# Patient Record
Sex: MALE | Race: WHITE | NOT HISPANIC OR LATINO
[De-identification: names, ages, dates, MRNs, and addresses within clinical notes are randomized per-mention and may not be internally consistent; named-entity substitution may affect disease eponyms.]

---

## 2018-06-08 ENCOUNTER — APPOINTMENT (OUTPATIENT)
Dept: MRI IMAGING | Facility: CLINIC | Age: 58
End: 2018-06-08
Payer: COMMERCIAL

## 2018-06-08 ENCOUNTER — OUTPATIENT (OUTPATIENT)
Dept: OUTPATIENT SERVICES | Facility: HOSPITAL | Age: 58
LOS: 1 days | End: 2018-06-08
Payer: COMMERCIAL

## 2018-06-08 DIAGNOSIS — Z00.8 ENCOUNTER FOR OTHER GENERAL EXAMINATION: ICD-10-CM

## 2018-06-08 PROCEDURE — A9585: CPT

## 2018-06-08 PROCEDURE — 70553 MRI BRAIN STEM W/O & W/DYE: CPT

## 2018-06-08 PROCEDURE — 70553 MRI BRAIN STEM W/O & W/DYE: CPT | Mod: 26

## 2019-11-18 ENCOUNTER — APPOINTMENT (OUTPATIENT)
Dept: ORTHOPEDIC SURGERY | Facility: CLINIC | Age: 59
End: 2019-11-18
Payer: COMMERCIAL

## 2019-11-18 VITALS
DIASTOLIC BLOOD PRESSURE: 82 MMHG | WEIGHT: 175 LBS | SYSTOLIC BLOOD PRESSURE: 139 MMHG | BODY MASS INDEX: 26.52 KG/M2 | TEMPERATURE: 98.5 F | HEART RATE: 64 BPM | HEIGHT: 68 IN

## 2019-11-18 DIAGNOSIS — M75.41 IMPINGEMENT SYNDROME OF RIGHT SHOULDER: ICD-10-CM

## 2019-11-18 DIAGNOSIS — M75.21 BICIPITAL TENDINITIS, RIGHT SHOULDER: ICD-10-CM

## 2019-11-18 DIAGNOSIS — Z78.9 OTHER SPECIFIED HEALTH STATUS: ICD-10-CM

## 2019-11-18 DIAGNOSIS — S43.431A SUPERIOR GLENOID LABRUM LESION OF RIGHT SHOULDER, INITIAL ENCOUNTER: ICD-10-CM

## 2019-11-18 PROCEDURE — 99214 OFFICE O/P EST MOD 30 MIN: CPT | Mod: 25

## 2019-11-18 PROCEDURE — 20610 DRAIN/INJ JOINT/BURSA W/O US: CPT | Mod: RT

## 2019-11-18 PROCEDURE — 73030 X-RAY EXAM OF SHOULDER: CPT | Mod: RT

## 2019-12-03 NOTE — PROCEDURE
[de-identified] : Consent: \par At this time, I have recommended an injection to the right shoulder.  The risks and benefits of the procedure were discussed with the patient in detail.  Upon verbal consent of the patient, we proceeded with the injection as noted below.  \par \par Procedure:  \par After a sterile prep, the patient underwent an injection of 9 cc of 1% Lidocaine without epinephrine and 1 cc of Kenalog into the right shoulder.  The patient tolerated the procedure well.  There were no complications.

## 2019-12-03 NOTE — DISCUSSION/SUMMARY
[de-identified] : At this time, I have recommended an injection for the right shoulder impingement, SLAP tear and biceps tendinitis, as noted above.  I have recommended ice and elevation.  He will be reassessed in three to four weeks.

## 2019-12-03 NOTE — ADDENDUM
[FreeTextEntry1] : This note was written by Saida Byrnes on 11/29/2019 acting as scribe for Flakito Good III, MD

## 2019-12-03 NOTE — HISTORY OF PRESENT ILLNESS
[de-identified] : The patient comes in today with complaints of pain to his right shoulder.  The patient states the onset/injury occurred on 10/1/19.  This injury is not work related or due to an automobile accident.  The patient states the pain is intermittent and radiating.  The patient describes the pain as stabbing.  The patient states rest, heat, ice and medication makes the pain better while lying down makes the pain worse.\par \par Pain level includes a current pain level of 6-8/10.\par \par Ailment Interference:  \par Daily Livin-5/10\par Normal Work:  6-7/10\par Sleep:  8/10\par Enjoyment of Life:  0/10\par Climbing Stairs:  0/10\par Sports:  8/10\par Extra-Curricular Activities:   8/10 [] : No

## 2019-12-03 NOTE — PHYSICAL EXAM
[de-identified] : Ambulating with a normal gait.  Station:  Normal.  [de-identified] : Left Shoulder:  \par Range of Motion in Degrees:   	\par    	                        Claimant:          Normal:  	\par Abduction (Active)  	180  	180 degrees  	\par Abduction (Passive)  	180  	180 degrees  	\par Forward elevation (Active):  	180  	180 degrees  	\par Forward elevation (Passive):  180  	180 degrees  	\par External rotation (Active):  	45  	45 degrees  	\par External rotation (Passive):  	45  	45 degrees  	\par Internal rotation (Active):  	L-1  	L-1  	\par Internal rotation (Passive):  	L-1  	L-1  	\par \par No motor weakness to internal rotation, external rotation or abduction in the scapular plane.  Negative crank test.  Negative O’Henrry’s test.  Negative Speed’s test. Negative Yergason’s test.  Negative cross arm test.  No tenderness to palpation at the AC joint. Negative Hawkin’s sign.  Negative Neer’s sign.  Negative apprehension. Negative sulcus sign.  No gross neurological or vascular deficits distally.  2+ radial and ulnar pulses.  Skin is intact.  No rashes, scars or lesions.  No extra-articular swelling or tenderness. \par \par Right Shoulder:  \par Range of Motion in Degrees:	\par 	                                  Claimant:	Normal:	\par Abduction (Active)	                  180	               180 degrees	\par Abduction (Passive)	  180	               180 degrees	\par Forward elevation (Active):	  180	               180 degrees	\par Forward elevation (Passive):	  180	               180 degrees	\par External rotation (Active):	   45	               45 degrees	\par External rotation (Passive):	   45	               45 degrees	\par Internal rotation (Active):	   L-1	               L-1	\par Internal rotation (Passive):	   L-1	               L-1	\par  \par No motor weakness to internal rotation, external rotation or abduction in the scapular plane.  Positive crank test.  Positive O’Henrry’s test.  Positive Speed’s test. Positive Yergason’s test.  Negative cross arm test.  No tenderness to palpation at the AC joint. Positive Hawkin’s sign.  Positive Neer’s sign. Negative apprehension. Negative sulcus sign.  No gross neurological or vascular deficits distally.  Skin is intact.  No rashes, scars or lesions. 2+ radial and ulnar pulses. No extra-articular swelling or tenderness.\par  [de-identified] : General Appearance:  Well-developed, well-nourished male in no acute distress. \par  [de-identified] : Radiographs, two views of the right shoulder are essentially normal.

## 2019-12-23 ENCOUNTER — APPOINTMENT (OUTPATIENT)
Dept: ORTHOPEDIC SURGERY | Facility: CLINIC | Age: 59
End: 2019-12-23
Payer: COMMERCIAL

## 2019-12-23 VITALS
HEIGHT: 68 IN | SYSTOLIC BLOOD PRESSURE: 126 MMHG | BODY MASS INDEX: 26.52 KG/M2 | TEMPERATURE: 98.3 F | WEIGHT: 175 LBS | HEART RATE: 68 BPM | DIASTOLIC BLOOD PRESSURE: 79 MMHG

## 2019-12-23 DIAGNOSIS — M18.12 UNILATERAL PRIMARY OSTEOARTHRITIS OF FIRST CARPOMETACARPAL JOINT, LEFT HAND: ICD-10-CM

## 2019-12-23 PROCEDURE — 20600 DRAIN/INJ JOINT/BURSA W/O US: CPT | Mod: LT

## 2019-12-23 PROCEDURE — 99213 OFFICE O/P EST LOW 20 MIN: CPT | Mod: 25

## 2020-01-05 NOTE — HISTORY OF PRESENT ILLNESS
[de-identified] : The patient comes in today with increasing complaints of pain to his left thumb.

## 2020-01-05 NOTE — DISCUSSION/SUMMARY
[de-identified] : At this time, due to basal joint arthritis of the left thumb, I recommended ice and elevation.  He will be reassessed in three to four weeks.

## 2020-01-05 NOTE — PROCEDURE
[de-identified] : Consent: \par At this time, I have recommended an injection to the left thumb.  The risks and benefits of the procedure were discussed with the patient in detail.  Upon verbal consent of the patient, we proceeded with the injection as noted below.  \par \par Procedure:  \par After a sterile prep, the patient underwent an injection of 2 cc of 1% Lidocaine without epinephrine and 0.5 cc of Kenalog into the left thumb.  The patient tolerated the procedure well.  There were no complications.  \par

## 2020-01-05 NOTE — PHYSICAL EXAM
[Normal] : Gait: normal [de-identified] : Right Thumb:\par \par                                    					           Claimant:  	   Normal:  \par \par Thumb Interphalangeal Hyperextension/Flexion		                           15H/80             15H/80\par \par Thumb Metacarpophalangeal Hyperextension/Flexion	                           10/55	    10/55\par \par Finger DIP Joints Extension/Flexion				             0/80	     0/80\par \par Finger PIP Joints Extension/Flexion 				             0/100	     0/100\par \par Finger MCP Joints Hyperextension/Flexion 			      (0-45H)/90	     (0-45H)/90\par \par FDS, FDP intact.  No triggering.  No tenderness or swelling over the A1 pulley for each finger.  No instability to varus or valgus stress.  No motor or sensory deficits.   Less than two second capillary refill.  Skin is intact.  No rashes, scars or lesions.\par \par Left Thumb:\par                                                                                                 Claimant:  	   Normal:  \par \par Thumb Interphalangeal Hyperextension/Flexion		15H/80		   15H/80\par \par Thumb Metacarpophalangeal Hyperextension/Flexion	10/55		    10/55\par \par Finger DIP Joints Extension/Flexion			0/80		     0/80\par \par Finger PIP Joints Extension/Flexion 			0/100		     0/100\par \par Finger MCP Joints Hyperextension/Flexion 		(0-45H)/90	     (0-45H)/90\par \par \par Significant tenderness at the basal joint with a positive grind.  FDS, FDP intact.  No triggering.  No tenderness or swelling over the A1 pulley for each finger.  No instability to varus or valgus stress.  No motor or sensory deficits.  Less than two second capillary refill.  Skin is intact.  No rashes, scars or lesions.\par   [de-identified] : Appearance:  Well-developed, well-nourished male in no acute distress.\par \par

## 2020-01-05 NOTE — ADDENDUM
[FreeTextEntry1] : This note was written by Margarita Weller on 12/31/2019 acting as a scribe for FRANTZ VELASCO III, MD

## 2020-05-27 ENCOUNTER — APPOINTMENT (OUTPATIENT)
Dept: ORTHOPEDIC SURGERY | Facility: CLINIC | Age: 60
End: 2020-05-27
Payer: COMMERCIAL

## 2020-05-27 VITALS
TEMPERATURE: 98.7 F | DIASTOLIC BLOOD PRESSURE: 78 MMHG | SYSTOLIC BLOOD PRESSURE: 135 MMHG | BODY MASS INDEX: 26.52 KG/M2 | WEIGHT: 175 LBS | HEIGHT: 68 IN | HEART RATE: 69 BPM

## 2020-05-27 PROCEDURE — 20600 DRAIN/INJ JOINT/BURSA W/O US: CPT | Mod: LT

## 2020-06-01 NOTE — PHYSICAL EXAM
[de-identified] : Ambulating with a normal gait.  Station:  Normal.  [de-identified] : Right Fingers/Hand: \par Range of Motion in Degrees: \par                                    					           Claimant:  	   Normal:  \par \par Thumb Interphalangeal Hyperextension/Flexion		                           15H/80             15H/80\par \par Thumb Metacarpophalangeal Hyperextension/Flexion	                           10/55	    10/55\par \par Finger DIP Joints Extension/Flexion				             0/80	     0/80\par \par Finger PIP Joints Extension/Flexion 				             0/100	     0/100\par \par Finger MCP Joints Hyperextension/Flexion 			      (0-45H)/90	     (0-45H)/90\par \par FDS, FDP intact.  No triggering.  No tenderness or swelling over the A1 pulley for each finger.  No instability to varus or valgus stress.  No motor or sensory deficits.   Less than two second capillary refill.  Skin is intact.  No rashes, scars or lesions.\par  \par Left Fingers/Hand: \par Range of Motion: \par                                                                                                 Claimant:  	   Normal:  \par \par Thumb Interphalangeal Hyperextension/Flexion		15H/80		   15H/80\par \par Thumb Metacarpophalangeal Hyperextension/Flexion	10/55		    10/55\par \par Finger DIP Joints Extension/Flexion			0/80		     0/80\par \par Finger PIP Joints Extension/Flexion 			0/100		     0/100\par \par Finger MCP Joints Hyperextension/Flexion 		(0-45H)/90	     (0-45H)/90\par \par \par Significant tenderness at the basal joint with a positive grind.  FDS, FDP intact.  No triggering.  No tenderness or swelling over the A1 pulley for each finger.  No instability to varus or valgus stress.  No motor or sensory deficits.  Less than two second capillary refill.  Skin is intact.  No rashes, scars or lesions.\par   [de-identified] : General Appearance:  Well-developed, well-nourished male in no acute distress.

## 2020-06-01 NOTE — ADDENDUM
[FreeTextEntry1] : This note was written by Saida Byrnes on 05/29/2020 acting as scribe for Flakito Good III, MD

## 2020-06-01 NOTE — DISCUSSION/SUMMARY
[de-identified] : At this time, I have recommended ice for the left basal joint arthritis.  He will be reassessed in three to four weeks.

## 2020-06-01 NOTE — HISTORY OF PRESENT ILLNESS
[de-identified] : The patient comes in today with increasing complaints of pain to his left basal joint.

## 2020-06-01 NOTE — PROCEDURE
[de-identified] : Consent: \par At this time, I have recommended an injection to the left basal joint.  The risks and benefits of the procedure were discussed with the patient in detail.  Upon verbal consent of the patient, we proceeded with the injection as noted below.  \par \par Procedure:  \par After a sterile prep, the patient underwent an injection of 2 cc of 1% Lidocaine without epinephrine and 0.5 cc of Kenalog into the left basal joint.  The patient tolerated the procedure well.  There were no complications.

## 2020-07-22 ENCOUNTER — EMERGENCY (EMERGENCY)
Facility: HOSPITAL | Age: 60
LOS: 0 days | Discharge: HOME | End: 2020-07-22
Payer: COMMERCIAL

## 2020-07-22 VITALS
TEMPERATURE: 100 F | OXYGEN SATURATION: 99 % | HEART RATE: 68 BPM | SYSTOLIC BLOOD PRESSURE: 108 MMHG | DIASTOLIC BLOOD PRESSURE: 51 MMHG | RESPIRATION RATE: 16 BRPM

## 2020-07-22 DIAGNOSIS — Z20.828 CONTACT WITH AND (SUSPECTED) EXPOSURE TO OTHER VIRAL COMMUNICABLE DISEASES: ICD-10-CM

## 2020-07-22 PROCEDURE — U0003: CPT

## 2020-07-22 PROCEDURE — 99283 EMERGENCY DEPT VISIT LOW MDM: CPT

## 2020-07-22 NOTE — ED STATDOCS - NSFOLLOWUPINSTRUCTIONS_ED_ALL_ED_FT
Pt here for COVID-19 testing. Pt non toxic. Pt was swabbed for COVID-19 in their car in drive through area. [facility] will call pt with results. return precautions given. Home self-quarantine recommended. Pt agrees with plan of  care.

## 2020-07-22 NOTE — ED STATDOCS - PATIENT PORTAL LINK FT
You can access the FollowMyHealth Patient Portal offered by [facility] by registering at the following website: [email or web address]. By joining [employer]’s FollowMyHealth portal, you will also be able to view your health information using other applications (apps) compatible with our system.

## 2020-07-23 LAB — SARS-COV-2 RNA SPEC QL NAA+PROBE: SIGNIFICANT CHANGE UP

## 2020-08-05 ENCOUNTER — APPOINTMENT (OUTPATIENT)
Dept: ORTHOPEDIC SURGERY | Facility: CLINIC | Age: 60
End: 2020-08-05

## 2020-10-05 ENCOUNTER — APPOINTMENT (OUTPATIENT)
Dept: ORTHOPEDIC SURGERY | Facility: CLINIC | Age: 60
End: 2020-10-05
Payer: COMMERCIAL

## 2020-10-05 VITALS
HEIGHT: 68 IN | TEMPERATURE: 98.2 F | WEIGHT: 170 LBS | BODY MASS INDEX: 25.76 KG/M2 | DIASTOLIC BLOOD PRESSURE: 79 MMHG | HEART RATE: 62 BPM | SYSTOLIC BLOOD PRESSURE: 128 MMHG

## 2020-10-05 PROCEDURE — 20600 DRAIN/INJ JOINT/BURSA W/O US: CPT | Mod: FA

## 2020-10-07 NOTE — ADDENDUM
[FreeTextEntry1] : This note was written by Margarita Weller on 10/06/2020 acting as a scribe for FRANTZ VELASCO III, MD

## 2020-10-07 NOTE — PROCEDURE
[de-identified] : Consent: \par At this time, I have recommended an injection to the left thumb.  The risks and benefits of the procedure were discussed with the patient in detail.  Upon verbal consent of the patient, we proceeded with the injection as noted below.  \par \par Procedure:  \par After a sterile prep, the patient underwent an injection of 2 cc of 1% Lidocaine without epinephrine and 0.5 cc of Kenalog into the left thumb.  The patient tolerated the procedure well.  There were no complications.  \par

## 2020-10-07 NOTE — HISTORY OF PRESENT ILLNESS
[de-identified] : The patient comes in today with increasing complaints of pain of the left thumb.

## 2020-10-07 NOTE — PHYSICAL EXAM
[de-identified] : Left Thumb:\par                                                                                                 Claimant:  	   Normal:  \par \par Thumb Interphalangeal Hyperextension/Flexion		15H/80		   15H/80\par \par Thumb Metacarpophalangeal Hyperextension/Flexion	10/55		    10/55\par \par Finger DIP Joints Extension/Flexion			0/80		     0/80\par \par Finger PIP Joints Extension/Flexion 			0/100		     0/100\par \par Finger MCP Joints Hyperextension/Flexion 		(0-45H)/90	     (0-45H)/90\par \par \par Significant tenderness at the basal joint with a positive grind.  FDS, FDP intact.  No triggering.  No tenderness or swelling over the A1 pulley for each finger.  No instability to varus or valgus stress.  No motor or sensory deficits.  Less than two second capillary refill.  Skin is intact.  No rashes, scars or lesions.\par   [de-identified] : Ambulating with a normal gait. Station:  Normal.  [de-identified] : Appearance:  Well-developed, well-nourished male in no acute distress.\par

## 2020-12-09 ENCOUNTER — OUTPATIENT (OUTPATIENT)
Dept: OUTPATIENT SERVICES | Facility: HOSPITAL | Age: 60
LOS: 1 days | End: 2020-12-09
Payer: COMMERCIAL

## 2020-12-09 DIAGNOSIS — Z11.59 ENCOUNTER FOR SCREENING FOR OTHER VIRAL DISEASES: ICD-10-CM

## 2020-12-09 LAB — SARS-COV-2 RNA SPEC QL NAA+PROBE: SIGNIFICANT CHANGE UP

## 2020-12-09 PROCEDURE — U0003: CPT

## 2020-12-10 DIAGNOSIS — Z11.59 ENCOUNTER FOR SCREENING FOR OTHER VIRAL DISEASES: ICD-10-CM

## 2020-12-23 ENCOUNTER — OUTPATIENT (OUTPATIENT)
Dept: OUTPATIENT SERVICES | Facility: HOSPITAL | Age: 60
LOS: 1 days | End: 2020-12-23
Payer: COMMERCIAL

## 2020-12-23 DIAGNOSIS — Z20.828 CONTACT WITH AND (SUSPECTED) EXPOSURE TO OTHER VIRAL COMMUNICABLE DISEASES: ICD-10-CM

## 2020-12-23 LAB — SARS-COV-2 RNA SPEC QL NAA+PROBE: SIGNIFICANT CHANGE UP

## 2020-12-23 PROCEDURE — U0003: CPT

## 2020-12-23 PROCEDURE — C9803: CPT

## 2020-12-24 DIAGNOSIS — Z20.828 CONTACT WITH AND (SUSPECTED) EXPOSURE TO OTHER VIRAL COMMUNICABLE DISEASES: ICD-10-CM

## 2021-03-10 ENCOUNTER — OUTPATIENT (OUTPATIENT)
Dept: OUTPATIENT SERVICES | Facility: HOSPITAL | Age: 61
LOS: 1 days | End: 2021-03-10
Payer: COMMERCIAL

## 2021-03-10 DIAGNOSIS — Z20.828 CONTACT WITH AND (SUSPECTED) EXPOSURE TO OTHER VIRAL COMMUNICABLE DISEASES: ICD-10-CM

## 2021-03-10 LAB — SARS-COV-2 RNA SPEC QL NAA+PROBE: SIGNIFICANT CHANGE UP

## 2021-03-10 PROCEDURE — U0005: CPT

## 2021-03-10 PROCEDURE — C9803: CPT

## 2021-03-10 PROCEDURE — U0003: CPT

## 2021-03-11 DIAGNOSIS — Z20.828 CONTACT WITH AND (SUSPECTED) EXPOSURE TO OTHER VIRAL COMMUNICABLE DISEASES: ICD-10-CM

## 2021-04-14 ENCOUNTER — APPOINTMENT (OUTPATIENT)
Dept: ORTHOPEDIC SURGERY | Facility: CLINIC | Age: 61
End: 2021-04-14
Payer: COMMERCIAL

## 2021-04-14 VITALS
BODY MASS INDEX: 25.92 KG/M2 | SYSTOLIC BLOOD PRESSURE: 121 MMHG | TEMPERATURE: 97.8 F | WEIGHT: 175 LBS | HEIGHT: 69 IN | DIASTOLIC BLOOD PRESSURE: 71 MMHG | HEART RATE: 69 BPM

## 2021-04-14 PROCEDURE — 99072 ADDL SUPL MATRL&STAF TM PHE: CPT

## 2021-04-14 PROCEDURE — 20600 DRAIN/INJ JOINT/BURSA W/O US: CPT | Mod: LT

## 2021-04-19 NOTE — PROCEDURE
[de-identified] : Consent: \par At this time, I have recommended an injection to the left thumb.  The risks and benefits of the procedure were discussed with the patient in detail.  Upon verbal consent of the patient, we proceeded with the injection as noted below.  \par \par Procedure:  \par After a sterile prep, the patient underwent an injection of 2 cc of 1% Lidocaine without epinephrine and 0.5 cc of Kenalog into the left thumb.  The patient tolerated the procedure well.  There were no complications.

## 2021-04-19 NOTE — DISCUSSION/SUMMARY
[de-identified] : At this time, I have recommended ice and elevation for the left basal joint arthritis.  He will be reassessed in three to four weeks.

## 2021-04-19 NOTE — PHYSICAL EXAM
[de-identified] : Left Hand/Fingers: \par Range of Motion in Degrees:\par                                                                                                 Claimant:  	   Normal:  \par \par Thumb Interphalangeal Hyperextension/Flexion		15H/80		   15H/80\par \par Thumb Metacarpophalangeal Hyperextension/Flexion	10/55		    10/55\par \par Finger DIP Joints Extension/Flexion			0/80		     0/80\par \par Finger PIP Joints Extension/Flexion 			0/100		     0/100\par \par Finger MCP Joints Hyperextension/Flexion 		(0-45H)/90	     (0-45H)/90\par \par \par Significant tenderness at the basal joint with a positive grind.  FDS, FDP intact.  No triggering.  No tenderness or swelling over the A1 pulley for each finger.  No instability to varus or valgus stress.  No motor or sensory deficits.  Less than two second capillary refill.  Skin is intact.  No rashes, scars or lesions.\par  [de-identified] : General Appearance:  Well-developed, well-nourished male in no acute distress.  [de-identified] : Ambulating with a normal gait.  Station:  Normal.

## 2021-04-19 NOTE — ADDENDUM
[FreeTextEntry1] : This note was written by Saida Byrnes on 04/15/2021 acting as scribe for Flakito Good III, MD

## 2021-04-19 NOTE — HISTORY OF PRESENT ILLNESS
[de-identified] : The patient comes in today with increasing complaints of pain to his left thumb.

## 2021-11-09 ENCOUNTER — APPOINTMENT (OUTPATIENT)
Dept: ORTHOPEDIC SURGERY | Facility: CLINIC | Age: 61
End: 2021-11-09
Payer: COMMERCIAL

## 2021-11-09 VITALS
BODY MASS INDEX: 26.52 KG/M2 | HEIGHT: 68 IN | SYSTOLIC BLOOD PRESSURE: 125 MMHG | DIASTOLIC BLOOD PRESSURE: 73 MMHG | WEIGHT: 175 LBS | HEART RATE: 68 BPM

## 2021-11-09 DIAGNOSIS — S83.241A OTHER TEAR OF MEDIAL MENISCUS, CURRENT INJURY, RIGHT KNEE, INITIAL ENCOUNTER: ICD-10-CM

## 2021-11-09 DIAGNOSIS — S83.8X2A SPRAIN OF OTHER SPECIFIED PARTS OF LEFT KNEE, INITIAL ENCOUNTER: ICD-10-CM

## 2021-11-09 PROCEDURE — 99213 OFFICE O/P EST LOW 20 MIN: CPT

## 2021-11-09 PROCEDURE — 73560 X-RAY EXAM OF KNEE 1 OR 2: CPT | Mod: 50

## 2021-11-10 NOTE — HISTORY OF PRESENT ILLNESS
[de-identified] : The patient comes in today with bilateral knee pain.  The patient states that his left knee is worse than the right knee, but his left knee is continuously locking and getting caught.  He states he was doing some kind of twisting movement the other day, jumping and twisting, and his left knee kind of gave out and then locked.  He states his right knee has been bothering him for quite some time.  He states he has tried all conservative treatment and nothing has been helping.  He states it has also been locking.  \par

## 2021-11-10 NOTE — DISCUSSION/SUMMARY
[de-identified] : At this time, due to possible medial meniscal tear of bilateral knees, the patient will get MRIs to rule out meniscal tears as soon as possible for possible surgical procedure on the left and then the right.\par

## 2021-11-10 NOTE — ADDENDUM
[FreeTextEntry1] : This note was written by Herman Vanessa on 11/10/2021, acting as a scribe for CHAVEZ HERNANDEZ, KIAH/L, PA

## 2021-11-10 NOTE — PHYSICAL EXAM
[de-identified] : Right Knee: Range of Motion in Degrees	\par 	                  Claimant:	Normal:	\par Flexion Active	  135 	                135-degrees	\par Flexion Passive	  135	                135-degrees	\par Extension Active	  0-5	                0-5-degrees	\par Extension Passive	  0-5	                0-5-degrees	\par \par No weakness to flexion/extension.  No evidence of instability in the AP plane or varus or valgus stress.  Negative  Lachman.  Negative pivot shift.  Negative anterior drawer test.  Negative posterior drawer test.  Positive medial joint line tenderness.  Negative lateral joint line tenderness.  Positive Naya.  Positive Apley grind.  No tenderness over the medial and lateral facet of the patella.  No patellofemoral crepitations.  No lateral tilting patella.  No patella apprehension.  No crepitation in the medial and lateral femoral condyle.  No proximal or distal swelling, edema or tenderness.  No gross motor or sensory deficits.  Mild intra-articular swelling.  2+ DP and PT pulses.  No varus or valgus malalignment.  Skin is intact.  No rashes, scars or lesions.   \par \par Left Knee: Range of Motion in Degrees	\par 	                  Claimant:	Normal:	\par Flexion Active	  135 	                135-degrees	\par Flexion Passive	  135	                135-degrees	\par Extension Active	  0-5	                0-5-degrees	\par Extension Passive	  0-5	                0-5-degrees	\par \par No weakness to flexion/extension.  No evidence of instability in the AP plane or varus or valgus stress.  Negative  Lachman.  Negative pivot shift.  Negative anterior drawer test.  Negative posterior drawer test.  Positive medial joint line tenderness.  Negative lateral joint line tenderness.  Positive Naya.  Positive Apley grind.  No tenderness over the medial and lateral facet of the patella.  No patellofemoral crepitations.  No lateral tilting patella.  No patella apprehension.  No crepitation in the medial and lateral femoral condyle.  No proximal or distal swelling, edema or tenderness.  No gross motor or sensory deficits.  Mild intra-articular swelling.  2+ DP and PT pulses.  No varus or valgus malalignment.  Skin is intact.  No rashes, scars or lesions.   \par   [de-identified] : Gait and Station:  Ambulating with a slightly antalgic to antalgic gait.  Normal Station.  [de-identified] : Appearance:  Well developed, well-nourished male in no acute distress.\par   [de-identified] : Radiographs, one to two views of the right knee, reveal mild-to-moderate osteoarthritis.\par Radiographs, one to two views of the left knee, reveal mild-to-moderate osteoarthritis.\par Radiograph, one view AP standing of bilateral knees, reveals mild-to-moderate osteoarthritis.\par

## 2021-11-20 ENCOUNTER — APPOINTMENT (OUTPATIENT)
Dept: MRI IMAGING | Facility: CLINIC | Age: 61
End: 2021-11-20

## 2021-11-24 ENCOUNTER — APPOINTMENT (OUTPATIENT)
Dept: ORTHOPEDIC SURGERY | Facility: CLINIC | Age: 61
End: 2021-11-24
Payer: COMMERCIAL

## 2021-11-24 VITALS
HEART RATE: 71 BPM | WEIGHT: 175 LBS | HEIGHT: 68 IN | DIASTOLIC BLOOD PRESSURE: 82 MMHG | BODY MASS INDEX: 26.52 KG/M2 | SYSTOLIC BLOOD PRESSURE: 145 MMHG

## 2021-11-24 DIAGNOSIS — S83.207D UNSPECIFIED TEAR OF UNSPECIFIED MENISCUS, CURRENT INJURY, LEFT KNEE, SUBSEQUENT ENCOUNTER: ICD-10-CM

## 2021-11-24 PROCEDURE — 99213 OFFICE O/P EST LOW 20 MIN: CPT

## 2021-11-24 NOTE — PHYSICAL EXAM
[de-identified] : Right Knee: \par Range of Motion in Degrees	\par 	                  Claimant:	Normal:	\par Flexion Active	  135 	                135-degrees	\par Flexion Passive	  135	                135-degrees	\par Extension Active	  0-5	                0-5-degrees	\par Extension Passive	  0-5	                0-5-degrees	\par \par No weakness to flexion/extension.  No evidence of instability in the AP plane or varus or valgus stress.  Negative  Lachman.  Negative pivot shift.  Negative anterior drawer test.  Negative posterior drawer test.  Negative Naya.  Negative Apley grind.  No medial or lateral joint line tenderness.  Positive tenderness over the medial and lateral facet of the patella.  Positive patellofemoral crepitations.  No lateral tilting patella.  No patella apprehension.  Positive crepitation in the medial and lateral femoral condyle.  No proximal or distal swelling, edema or tenderness.  No gross motor or sensory deficits.  Mild intra-articular swelling.  2+ DP and PT pulses.  No varus or valgus malalignment.  Skin is intact.  No rashes, scars or lesions.  \par \par Left Knee: \par Range of Motion in Degrees	\par 	                  Claimant:	Normal:	\par Flexion Active	  135 	               135-degrees	\par Flexion Passive	  135	               135-degrees	\par Extension Active	  0-5	               0-5-degrees	\par Extension Passive     0-5	               0-5-degrees	\par \par No weakness to flexion/extension.  No evidence of instability in the AP plane or varus or valgus stress.  Negative  Lachman.  Negative pivot shift.  Negative anterior drawer test.  Negative posterior drawer test.  Positive Naya.  Positive Apley grind.  Positive medial joint line tenderness.  Negative lateral joint line tenderness.  Positive tenderness over the medial and lateral facet of the patella.  Positive patellofemoral crepitations.  No lateral tilting patella.  No patellar apprehension.  Positive crepitation in the medial and lateral femoral condyle.  No proximal or distal swelling, edema or tenderness.  No gross motor or sensory deficits.  Mild intra-articular swelling.  2+ DP and PT pulses.  No varus or valgus malalignment.  Skin is intact.  No rashes, scars or lesions. \par   [de-identified] : Ambulating with a slightly antalgic to antalgic gait.  Station:  Normal.  [de-identified] : Appearance:  Well-developed, well-nourished male in no acute distress.\par   [de-identified] : Review of the MRI shows osteoarthritis with partial meniscectomy of the right knee.  The left knee shows a complex tear of the posterior horn of the medial meniscus with some arthritic changes to the patellofemoral joint.

## 2021-11-24 NOTE — HISTORY OF PRESENT ILLNESS
[de-identified] : The patient comes in today with complaints of pain, particularly in his left knee.  I reviewed his MRI, as noted below.

## 2021-11-24 NOTE — ADDENDUM
[FreeTextEntry1] : This note was written by Margarita Weller on 11/24/2021 acting as a scribe for FRANTZ VELASCO III, MD

## 2021-11-24 NOTE — DISCUSSION/SUMMARY
[de-identified] : At this time, due to osteoarthritis with medial meniscus tear of the left knee, we had a long discussion and I recommend he undergo arthroscopy, meniscectomy and debridement.  All the risks and benefits of the surgery, including, but not limited to, anesthesia, infection, nerve and/or vascular injury and need for further surgery, were all discussed with the patient at length.  In light of these, patient wishes to proceed.  Patient will be scheduled at this time.

## 2021-12-08 ENCOUNTER — TRANSCRIPTION ENCOUNTER (OUTPATIENT)
Age: 61
End: 2021-12-08

## 2021-12-09 ENCOUNTER — OUTPATIENT (OUTPATIENT)
Dept: OUTPATIENT SERVICES | Facility: HOSPITAL | Age: 61
LOS: 1 days | End: 2021-12-09
Payer: COMMERCIAL

## 2021-12-09 VITALS
DIASTOLIC BLOOD PRESSURE: 78 MMHG | WEIGHT: 175.27 LBS | SYSTOLIC BLOOD PRESSURE: 134 MMHG | RESPIRATION RATE: 16 BRPM | TEMPERATURE: 99 F | OXYGEN SATURATION: 96 % | HEIGHT: 68 IN | HEART RATE: 92 BPM

## 2021-12-09 DIAGNOSIS — Z98.890 OTHER SPECIFIED POSTPROCEDURAL STATES: Chronic | ICD-10-CM

## 2021-12-09 DIAGNOSIS — S83.242A OTHER TEAR OF MEDIAL MENISCUS, CURRENT INJURY, LEFT KNEE, INITIAL ENCOUNTER: ICD-10-CM

## 2021-12-09 DIAGNOSIS — M17.12 UNILATERAL PRIMARY OSTEOARTHRITIS, LEFT KNEE: ICD-10-CM

## 2021-12-09 LAB
ANION GAP SERPL CALC-SCNC: 7 MMOL/L — SIGNIFICANT CHANGE UP (ref 5–17)
BASOPHILS # BLD AUTO: 0.05 K/UL — SIGNIFICANT CHANGE UP (ref 0–0.2)
BASOPHILS NFR BLD AUTO: 0.7 % — SIGNIFICANT CHANGE UP (ref 0–2)
BUN SERPL-MCNC: 19 MG/DL — SIGNIFICANT CHANGE UP (ref 7–23)
CALCIUM SERPL-MCNC: 9.1 MG/DL — SIGNIFICANT CHANGE UP (ref 8.5–10.1)
CHLORIDE SERPL-SCNC: 105 MMOL/L — SIGNIFICANT CHANGE UP (ref 96–108)
CO2 SERPL-SCNC: 27 MMOL/L — SIGNIFICANT CHANGE UP (ref 22–31)
CREAT SERPL-MCNC: 0.95 MG/DL — SIGNIFICANT CHANGE UP (ref 0.5–1.3)
EOSINOPHIL # BLD AUTO: 0.08 K/UL — SIGNIFICANT CHANGE UP (ref 0–0.5)
EOSINOPHIL NFR BLD AUTO: 1.2 % — SIGNIFICANT CHANGE UP (ref 0–6)
GLUCOSE SERPL-MCNC: 96 MG/DL — SIGNIFICANT CHANGE UP (ref 70–99)
HCT VFR BLD CALC: 43.3 % — SIGNIFICANT CHANGE UP (ref 39–50)
HGB BLD-MCNC: 15 G/DL — SIGNIFICANT CHANGE UP (ref 13–17)
IMM GRANULOCYTES NFR BLD AUTO: 0.4 % — SIGNIFICANT CHANGE UP (ref 0–1.5)
INR BLD: 1.03 RATIO — SIGNIFICANT CHANGE UP (ref 0.88–1.16)
LYMPHOCYTES # BLD AUTO: 1.05 K/UL — SIGNIFICANT CHANGE UP (ref 1–3.3)
LYMPHOCYTES # BLD AUTO: 15.2 % — SIGNIFICANT CHANGE UP (ref 13–44)
MCHC RBC-ENTMCNC: 32.1 PG — SIGNIFICANT CHANGE UP (ref 27–34)
MCHC RBC-ENTMCNC: 34.6 GM/DL — SIGNIFICANT CHANGE UP (ref 32–36)
MCV RBC AUTO: 92.7 FL — SIGNIFICANT CHANGE UP (ref 80–100)
MONOCYTES # BLD AUTO: 0.51 K/UL — SIGNIFICANT CHANGE UP (ref 0–0.9)
MONOCYTES NFR BLD AUTO: 7.4 % — SIGNIFICANT CHANGE UP (ref 2–14)
NEUTROPHILS # BLD AUTO: 5.18 K/UL — SIGNIFICANT CHANGE UP (ref 1.8–7.4)
NEUTROPHILS NFR BLD AUTO: 75.1 % — SIGNIFICANT CHANGE UP (ref 43–77)
PLATELET # BLD AUTO: 299 K/UL — SIGNIFICANT CHANGE UP (ref 150–400)
POTASSIUM SERPL-MCNC: 3.8 MMOL/L — SIGNIFICANT CHANGE UP (ref 3.5–5.3)
POTASSIUM SERPL-SCNC: 3.8 MMOL/L — SIGNIFICANT CHANGE UP (ref 3.5–5.3)
PROTHROM AB SERPL-ACNC: 12 SEC — SIGNIFICANT CHANGE UP (ref 10.6–13.6)
RBC # BLD: 4.67 M/UL — SIGNIFICANT CHANGE UP (ref 4.2–5.8)
RBC # FLD: 11.8 % — SIGNIFICANT CHANGE UP (ref 10.3–14.5)
SODIUM SERPL-SCNC: 139 MMOL/L — SIGNIFICANT CHANGE UP (ref 135–145)
WBC # BLD: 6.9 K/UL — SIGNIFICANT CHANGE UP (ref 3.8–10.5)
WBC # FLD AUTO: 6.9 K/UL — SIGNIFICANT CHANGE UP (ref 3.8–10.5)

## 2021-12-09 PROCEDURE — 93010 ELECTROCARDIOGRAM REPORT: CPT

## 2021-12-09 PROCEDURE — 85730 THROMBOPLASTIN TIME PARTIAL: CPT

## 2021-12-09 PROCEDURE — 36415 COLL VENOUS BLD VENIPUNCTURE: CPT

## 2021-12-09 PROCEDURE — 93005 ELECTROCARDIOGRAM TRACING: CPT

## 2021-12-09 PROCEDURE — 85025 COMPLETE CBC W/AUTO DIFF WBC: CPT

## 2021-12-09 PROCEDURE — G0463: CPT | Mod: 25

## 2021-12-09 PROCEDURE — 80048 BASIC METABOLIC PNL TOTAL CA: CPT

## 2021-12-09 PROCEDURE — 85610 PROTHROMBIN TIME: CPT

## 2021-12-09 NOTE — H&P PST ADULT - HISTORY OF PRESENT ILLNESS
This is a 60 y/o male with no PMH. Pt has Left knee pain for the last year after injuring his knee is sports. Pain has come and gone at intervals but now is consistence He has tried physical therapy without success. Now scheduled for Left knee arthroscopy. Denies any SOB, chest pain, palpations or recent fevers.

## 2021-12-10 DIAGNOSIS — M17.12 UNILATERAL PRIMARY OSTEOARTHRITIS, LEFT KNEE: ICD-10-CM

## 2021-12-10 DIAGNOSIS — S83.242A OTHER TEAR OF MEDIAL MENISCUS, CURRENT INJURY, LEFT KNEE, INITIAL ENCOUNTER: ICD-10-CM

## 2021-12-13 RX ORDER — SODIUM CHLORIDE 9 MG/ML
1000 INJECTION, SOLUTION INTRAVENOUS
Refills: 0 | Status: DISCONTINUED | OUTPATIENT
Start: 2021-12-14 | End: 2021-12-14

## 2021-12-13 RX ORDER — OXYCODONE AND ACETAMINOPHEN 5; 325 MG/1; MG/1
5-325 TABLET ORAL
Qty: 28 | Refills: 0 | Status: ACTIVE | COMMUNITY
Start: 2021-12-13 | End: 1900-01-01

## 2021-12-13 RX ORDER — OXYCODONE HYDROCHLORIDE 5 MG/1
5 TABLET ORAL ONCE
Refills: 0 | Status: DISCONTINUED | OUTPATIENT
Start: 2021-12-14 | End: 2021-12-14

## 2021-12-13 RX ORDER — ONDANSETRON 8 MG/1
4 TABLET, FILM COATED ORAL EVERY 6 HOURS
Refills: 0 | Status: DISCONTINUED | OUTPATIENT
Start: 2021-12-14 | End: 2021-12-14

## 2021-12-13 RX ORDER — SODIUM CHLORIDE 9 MG/ML
3 INJECTION INTRAMUSCULAR; INTRAVENOUS; SUBCUTANEOUS EVERY 8 HOURS
Refills: 0 | Status: DISCONTINUED | OUTPATIENT
Start: 2021-12-14 | End: 2021-12-14

## 2021-12-13 RX ORDER — FENTANYL CITRATE 50 UG/ML
50 INJECTION INTRAVENOUS
Refills: 0 | Status: DISCONTINUED | OUTPATIENT
Start: 2021-12-14 | End: 2021-12-14

## 2021-12-14 ENCOUNTER — APPOINTMENT (OUTPATIENT)
Dept: ORTHOPEDIC SURGERY | Facility: HOSPITAL | Age: 61
End: 2021-12-14
Payer: COMMERCIAL

## 2021-12-14 ENCOUNTER — RESULT REVIEW (OUTPATIENT)
Age: 61
End: 2021-12-14

## 2021-12-14 ENCOUNTER — OUTPATIENT (OUTPATIENT)
Dept: INPATIENT UNIT | Facility: HOSPITAL | Age: 61
LOS: 1 days | Discharge: HOME | End: 2021-12-14
Payer: COMMERCIAL

## 2021-12-14 VITALS
RESPIRATION RATE: 15 BRPM | SYSTOLIC BLOOD PRESSURE: 123 MMHG | OXYGEN SATURATION: 100 % | HEART RATE: 51 BPM | DIASTOLIC BLOOD PRESSURE: 80 MMHG | TEMPERATURE: 97 F

## 2021-12-14 VITALS
SYSTOLIC BLOOD PRESSURE: 125 MMHG | DIASTOLIC BLOOD PRESSURE: 82 MMHG | RESPIRATION RATE: 16 BRPM | WEIGHT: 175.27 LBS | TEMPERATURE: 98 F | HEART RATE: 70 BPM | OXYGEN SATURATION: 99 % | HEIGHT: 68 IN

## 2021-12-14 DIAGNOSIS — M17.12 UNILATERAL PRIMARY OSTEOARTHRITIS, LEFT KNEE: ICD-10-CM

## 2021-12-14 DIAGNOSIS — S83.242A OTHER TEAR OF MEDIAL MENISCUS, CURRENT INJURY, LEFT KNEE, INITIAL ENCOUNTER: ICD-10-CM

## 2021-12-14 DIAGNOSIS — Z98.890 OTHER SPECIFIED POSTPROCEDURAL STATES: Chronic | ICD-10-CM

## 2021-12-14 PROCEDURE — 88304 TISSUE EXAM BY PATHOLOGIST: CPT

## 2021-12-14 PROCEDURE — 88304 TISSUE EXAM BY PATHOLOGIST: CPT | Mod: 26

## 2021-12-14 PROCEDURE — 29881 ARTHRS KNE SRG MNISECTMY M/L: CPT | Mod: LT

## 2021-12-14 RX ORDER — FAMOTIDINE 10 MG/ML
20 INJECTION INTRAVENOUS ONCE
Refills: 0 | Status: COMPLETED | OUTPATIENT
Start: 2021-12-14 | End: 2021-12-14

## 2021-12-14 RX ORDER — ASCORBIC ACID 60 MG
1 TABLET,CHEWABLE ORAL
Qty: 0 | Refills: 0 | DISCHARGE

## 2021-12-14 RX ORDER — ACETAMINOPHEN 500 MG
975 TABLET ORAL ONCE
Refills: 0 | Status: COMPLETED | OUTPATIENT
Start: 2021-12-14 | End: 2021-12-14

## 2021-12-14 RX ORDER — UBIDECARENONE 100 MG
1 CAPSULE ORAL
Qty: 0 | Refills: 0 | DISCHARGE

## 2021-12-14 RX ORDER — CHOLECALCIFEROL (VITAMIN D3) 125 MCG
1 CAPSULE ORAL
Qty: 0 | Refills: 0 | DISCHARGE

## 2021-12-14 RX ADMIN — Medication 975 MILLIGRAM(S): at 07:01

## 2021-12-14 RX ADMIN — SODIUM CHLORIDE 75 MILLILITER(S): 9 INJECTION, SOLUTION INTRAVENOUS at 09:02

## 2021-12-14 RX ADMIN — FAMOTIDINE 20 MILLIGRAM(S): 10 INJECTION INTRAVENOUS at 07:01

## 2021-12-14 NOTE — ASU PATIENT PROFILE, ADULT - FALL HARM RISK - UNIVERSAL INTERVENTIONS
Bed in lowest position, wheels locked, appropriate side rails in place/Call bell, personal items and telephone in reach/Instruct patient to call for assistance before getting out of bed or chair/Non-slip footwear when patient is out of bed/[place] to call system/Physically safe environment - no spills, clutter or unnecessary equipment/Purposeful Proactive Rounding/Room/bathroom lighting operational, light cord in reach

## 2021-12-14 NOTE — ASU DISCHARGE PLAN (ADULT/PEDIATRIC) - CARE PROVIDER_API CALL
Flakito Good)  Orthopaedic Surgery  [address] [place] [address]  Phone: [phone]  Fax: [phone]  Follow Up Time:

## 2021-12-14 NOTE — ASU DISCHARGE PLAN (ADULT/PEDIATRIC) - "IF YOU OR YOUR GUARDIAN/FAMILY IS A SMOKER, IT IS IMPORTANT FOR YOUR HEALTH TO STOP SMOKING. PLEASE BE AWARE THAT SECOND HAND SMOKE IS ALSO HARMFUL."
House Officer Note:  Called to evaluate for refractory bradycardia and hypotension despite Dopamine infusion. Per EHR, Mr. Han has had a recent decreased in cardiac function, severe symptomatic bradycardia, and concern for coronary artery disease. He had an episode of symptomatic bradycardia last night. At this time he is on Dopamine 5- mcg/kg/min with HR dropping into the 40s- 50s and SBP 70- 80. Called to further discuss treatment options.     I discussed the pathways of healthcare with family. We discussed the resuscitative pathway, which includes both temporary and permanent pacemakers, transfer to ICU for additional vasoactive medications, and coronary angiography. We discussed that all the interventions on the resuscitative pathway have inherent risks which potentially require invasive surgeries and DNR/DNI rescinded. We discussed that while some procedures, such as angiography and pacemaker, seem benign, they do come with a proverbial rabbit hole in the event complications arise and invasive emergent surgeries are required. His wife is in agreement she would not want him to have CPR. Mr. Han states he would not want a procedure which would have risks and discomforts. The family understands that open heart surgery has been clearly not an option per Cardiology. We discussed on this pathway there is the potential for the family to decide to withdraw medical care if care is deemed futile.     We discussed the restorative pathway, which includes transfer to ICU for a line and added vasoactive medications, and the best medical therapy up to the point of death. We discussed on this pathway our goal is medical optimization in an effort to get Mr. Han out of the hospital to home or TCU. We discussed that the best medical therapy is not always enough to support restoration of care and we may need to accept his condition is potentially part of the dying process.     We discussed the comfort care  Statement Selected pathway, which includes discontinuation of any medical therapy which has the potential to cause comfort. We discussed that on this pathway we accept that comfort is the ultimate priority and that providing comfort may hasten death.     His wife defers decisions to Mr. Han. Per EHR review he is documented to have severe dementia and requires 24- hour care. No SLUMS or MoCA documented. His son is present and has asked to defer any decision making until his sister returns to the hospital later this evening. I will update my colleague, AMINA Queen, APRN, CNP.     We discussed that a decision at this time is not absolutely necessary, however deferring a decision may mean we are faced with making a decision in the middle of the night. It appears Palliative Care would be very beneficial prior to rescinding DNR/DNI and pursuing invasive studies.     Plan:  - okay to go to Dopamine 10- mcg/kg/min IV as discussed with Dr. Ridley, Cardiology Fellow

## 2021-12-14 NOTE — ASU DISCHARGE PLAN (ADULT/PEDIATRIC) - NS MD DC FALL RISK RISK
For information on Fall & Injury Prevention, visit: [email or web address] OR  [email or web address] OR  https://www.cdc.gov/steadi/patient.html

## 2021-12-14 NOTE — ASU PATIENT PROFILE, ADULT - NSTOBACCONEVERSMOKERY/N_GEN_A
4:30  I spoke with pt - he will call his dentist tomorrow and ask for rx for the sbe jimena/jaky   Yes

## 2021-12-14 NOTE — ASU PATIENT PROFILE, ADULT - AS SC BRADEN NUTRITION
Prescription approved per [facility] Refill Protocol.    
trospium (SANCTURA XR) 60 MG CP24 24 hr capsule      Last Written Prescription Date: 7/11/17  Last Fill Quantity: 30,  # refills: 0   Last Office Visit with [facility], [facility] or [facility] prescribing provider: 7/21/17          Thomas Faarax  [facility]                                               
(4) excellent

## 2021-12-14 NOTE — ASU PATIENT PROFILE, ADULT - NSICDXPASTMEDICALHX_GEN_ALL_CORE_FT
PAST MEDICAL HISTORY:  Aortic regurgitation     Dilated aortic root     History of carotid stenosis     Mitral regurgitation

## 2021-12-14 NOTE — ASU DISCHARGE PLAN (ADULT/PEDIATRIC) - ASU DC SPECIAL INSTRUCTIONSFT
Follow up with Dr. Good in 7-10 days. Call office for appointment. Take medications as prescribed. Keep dressing clean, dry, and intact. Rest, ice, and elevate affected extremity.

## 2021-12-14 NOTE — ASU PATIENT PROFILE, ADULT - TEACHING/LEARNING LEARNING PREFERENCES
Visit Information Date & Time Provider Department Dept. Phone Encounter #  
 4/18/2017  9:40 AM [place] [id number] [facility] [phone] [id number] Upcoming Health Maintenance Date Due DTaP/Tdap/Td series (1 - Tdap) 11/6/1968 ZOSTER VACCINE AGE 60> 11/6/2007 Pneumococcal 65+ Low/Medium Risk (2 of 2 - PCV13) 10/10/2014 INFLUENZA AGE 9 TO ADULT 8/1/2016 GLAUCOMA SCREENING Q2Y 4/30/2017 MEDICARE YEARLY EXAM 6/8/2017 BREAST CANCER SCRN MAMMOGRAM 6/7/2018 COLONOSCOPY 10/10/2023 Allergies as of 4/18/2017  Review Complete On: 4/18/2017 By: Maxwell Kwon NP Severity Noted Reaction Type Reactions Ace Inhibitors  09/20/2011   Side Effect Cough Thiazides  09/21/2011   Topical Photosensitivity Current Immunizations  Reviewed on 10/28/2015 Name Date Influenza High Dose Vaccine PF 10/28/2015 Influenza Vaccine 12/30/2014, 9/1/2013 Pneumococcal Polysaccharide (PPSV-23) 10/10/2013 11:42 AM  
  
 Not reviewed this visit You Were Diagnosed With   
  
 Codes Comments Palpitations    -  Primary ICD-10-CM: R00.2 ICD-9-CM: 785.1 Hypercholesteremia     ICD-10-CM: E78.00 ICD-9-CM: 272.0   
 LISANDRA (generalized anxiety disorder)     ICD-10-CM: F41.1 ICD-9-CM: 300.02 Benign essential hypertension     ICD-10-CM: I10 
ICD-9-CM: 401.1 Traumatic hematoma of lower leg, left, initial encounter     ICD-10-CM: J56.08PP ICD-9-CM: 924.10 Vitals BP Pulse Temp Resp Height(growth percentile) Weight(growth percentile) 142/78 78 97.7 °F (36.5 °C) (Oral) 18 5' 6\" (1.676 m) 234 lb (106.1 kg) SpO2 BMI OB Status Smoking Status 98% 37.77 kg/m2 Postmenopausal Never Smoker Vitals History BMI and BSA Data Body Mass Index Body Surface Area  
 37.77 kg/m 2 2.22 m 2 Preferred Pharmacy Pharmacy Name Phone  [address] AT [address] [place] [address] [address] [phone] Your Updated Medication List  
  
   
This list is accurate as of: 4/18/17 11:01 AM.  Always use your most recent med list.  
  
  
  
  
 atorvastatin 40 mg tablet Commonly known as:  LIPITOR Take 1 Tab by mouth daily. losartan 100 mg tablet Commonly known as:  COZAAR  
TAKE 1 TABLET BY MOUTH DAILY  
  
 multivitamin tablet Commonly known as:  ONE A DAY Take 1 Tab by mouth daily. To-Do List   
 06/05/2017 Lab:  LIPID PANEL Patient Instructions Careful with salt/sodium Drink more water Elevate legs when sitting Warm compress to bruised area on Left Leg for 20 minutes per application to help speed the resolution of hematoma/bruising Report repeated episodes of heart fluttering for possible 24 hour monitor Introducing [facility]! [employer] introduces [employer] patient portal. Now you can access parts of your medical record, email your doctor's office, and request medication refills online. 1. In your internet browser, go to [email or web address] 2. Click on the First Time User? Click Here link in the Sign In box. You will see the New Member Sign Up page. 3. Enter your [employer] Access Code exactly as it appears below. You will not need to use this code after youve completed the sign-up process. If you do not sign up before the expiration date, you must request a new code. · [employer] Access Code: [email or web address] Expires: 6/12/2017 12:19 PM 
 
4. Enter the last four digits of your Social Security Number (xxxx) and Date of Birth (mm/dd/yyyy) as indicated and click Submit. You will be taken to the next sign-up page. 5. Create a [employer] ID. This will be your [employer] login ID and cannot be changed, so think of one that is secure and easy to remember. 6. Create a [employer] password. You can change your password at any time. 7. Enter your Password Reset Question and Answer. This can be used at a later time if you forget your password. 8. Enter your e-mail address. You will receive e-mail notification when new information is available in [address] 9. Click Sign Up. You can now view and download portions of your medical record. 10. Click the Download Summary menu link to download a portable copy of your medical information. If you have questions, please visit the Frequently Asked Questions section of the [employer] website. Remember, [employer] is NOT to be used for urgent needs. For medical emergencies, dial 911. Now available from your iPhone and Android! Please provide this summary of care documentation to your next provider. Your primary care clinician is listed as [address] If you have any questions after today's visit, please call [phone]. verbal instruction/written material

## 2021-12-16 DIAGNOSIS — M65.861 OTHER SYNOVITIS AND TENOSYNOVITIS, RIGHT LOWER LEG: ICD-10-CM

## 2021-12-16 DIAGNOSIS — M23.221 DERANGEMENT OF POSTERIOR HORN OF MEDIAL MENISCUS DUE TO OLD TEAR OR INJURY, RIGHT KNEE: ICD-10-CM

## 2021-12-16 DIAGNOSIS — M17.11 UNILATERAL PRIMARY OSTEOARTHRITIS, RIGHT KNEE: ICD-10-CM

## 2021-12-16 DIAGNOSIS — Z87.891 PERSONAL HISTORY OF NICOTINE DEPENDENCE: ICD-10-CM
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
## 2021-12-16 DIAGNOSIS — M25.561 PAIN IN RIGHT KNEE: ICD-10-CM
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
## 2021-12-23 ENCOUNTER — APPOINTMENT (OUTPATIENT)
Dept: ORTHOPEDIC SURGERY | Facility: CLINIC | Age: 61
End: 2021-12-23
Payer: COMMERCIAL

## 2021-12-23 VITALS
HEIGHT: 68 IN | WEIGHT: 175 LBS | SYSTOLIC BLOOD PRESSURE: 144 MMHG | HEART RATE: 72 BPM | BODY MASS INDEX: 26.52 KG/M2 | DIASTOLIC BLOOD PRESSURE: 77 MMHG

## 2021-12-23 DIAGNOSIS — Z98.890 OTHER SPECIFIED POSTPROCEDURAL STATES: ICD-10-CM

## 2021-12-23 PROCEDURE — 99024 POSTOP FOLLOW-UP VISIT: CPT

## 2021-12-27 NOTE — ADDENDUM
[FreeTextEntry1] : This note was written by Herman Vanessa on 12/27/2021, acting as a scribe for Flakito Good III, MD

## 2021-12-27 NOTE — HISTORY OF PRESENT ILLNESS
[de-identified] : Postop Left Knee [de-identified] : The patient comes in today for his left knee.  He states he is definitely feeling better. [de-identified] : Status post left knee arthroscopy [de-identified] : Left Knee:  The wound is healing with no sign of infection.  Sutures are removed. [de-identified] : At this time, since the patient is doing well status post left knee arthroscopy, he will start on home therapeutic modalities.  Of note, he is going to be driving to [place] and I recommended a baby aspirin a day for DVT prophylaxis.  \par

## 2022-05-17 PROBLEM — I35.1 NONRHEUMATIC AORTIC (VALVE) INSUFFICIENCY: Chronic | Status: ACTIVE | Noted: 2021-12-14

## 2022-05-17 PROBLEM — Z86.79 PERSONAL HISTORY OF OTHER DISEASES OF THE CIRCULATORY SYSTEM: Chronic | Status: ACTIVE | Noted: 2021-12-14

## 2022-05-17 PROBLEM — I34.0 NONRHEUMATIC MITRAL (VALVE) INSUFFICIENCY: Chronic | Status: ACTIVE | Noted: 2021-12-14

## 2022-05-17 PROBLEM — I77.810 THORACIC AORTIC ECTASIA: Chronic | Status: ACTIVE | Noted: 2021-12-14

## 2022-05-18 ENCOUNTER — APPOINTMENT (OUTPATIENT)
Dept: ORTHOPEDIC SURGERY | Facility: CLINIC | Age: 62
End: 2022-05-18

## 2022-05-18 DIAGNOSIS — M17.11 UNILATERAL PRIMARY OSTEOARTHRITIS, RIGHT KNEE: ICD-10-CM

## 2022-05-18 DIAGNOSIS — S83.206A UNSPECIFIED TEAR OF UNSPECIFIED MENISCUS, CURRENT INJURY, RIGHT KNEE, INITIAL ENCOUNTER: ICD-10-CM

## 2022-05-18 PROCEDURE — 99214 OFFICE O/P EST MOD 30 MIN: CPT | Mod: 25

## 2022-05-18 PROCEDURE — 73560 X-RAY EXAM OF KNEE 1 OR 2: CPT | Mod: RT

## 2022-05-18 PROCEDURE — 20610 DRAIN/INJ JOINT/BURSA W/O US: CPT | Mod: RT

## 2022-05-19 NOTE — HISTORY OF PRESENT ILLNESS
[de-identified] : The patient comes in today stating his right knee has locked on him with swelling and he is in a lot of pain.

## 2022-05-19 NOTE — PROCEDURE
[de-identified] : Indications:  \par Osteoarthritis with meniscal tear right knee\par \par Consent: \par At this time, I have recommended an injection to the right knee.  The risks and benefits of the procedure were discussed with the patient in detail.  Upon verbal consent of the patient, we proceeded with the injection as noted below.  \par \par Description of Procedure:  \par After a sterile prep, the patient underwent an injection of 9 cc of 1% Lidocaine without epinephrine and 1 cc of Kenalog (40 mg/mL) into the right knee.  The patient tolerated the procedure well.  There were no complications.  \par \par :  [employer]par Drug Name:  Triamcinolone Acetonide Injectable Suspension USP\par NCD#:  3165-0671-28\par Lot#:  [id number]\par Expiration Date:  09/23\par \par Consent: \par At this time, I have recommended an aspiration to the right knee. The risks and benefits of the procedure were discussed with the patient in detail. Upon verbal consent of the patient, we proceeded with the injection as noted below. \par \par Description of Procedure: \par After a sterile prep, the patient underwent an aspiration with about 30 cc of bloody fluid to the right knee.  Once this was done, he was able to fully extend and maximally flex his knee.  He has no pain.

## 2022-05-19 NOTE — DISCUSSION/SUMMARY
[de-identified] : At this time, due to osteoarthritis with possible meniscal tear of the right knee, the patient was given a cortisone injection and underwent aspiration of the right knee.  I had a long discussion with the patient and I advised him that I think he will require an arthroscopy, meniscectomy and debridement.  We will start him on home therapeutic modalities.  He will be reassessed in 1-2 weeks to discuss the potential for further intervention.

## 2022-05-19 NOTE — PHYSICAL EXAM
[de-identified] : Right Knee:\par Knee: Range of Motion in Degrees	\par 	                  Claimant:	Normal:	\par Flexion Active	  90 	               135-degrees	\par Flexion Passive	  90	               135-degrees	\par Extension Active	  10	               0-5-degrees	\par Extension Passive     10	               0-5-degrees	\par \par No weakness to flexion/extension.  No evidence of instability in the AP plane or varus or valgus stress.  Negative  Lachman.  Negative pivot shift.  Negative anterior drawer test.  Negative posterior drawer test.  Positive Naya.  Positive Apley grind.  Positive medial joint line tenderness.  Negative lateral joint line tenderness.  Positive tenderness over the medial and lateral facet of the patella.  Positive patellofemoral crepitations.  No lateral tilting patella.  No patellar apprehension.  Positive crepitation in the medial and lateral femoral condyle.  No proximal or distal tenderness.  No gross motor or sensory deficits.  Moderate effusion.  2+ DP and PT pulses.  No varus or valgus malalignment.  Multiple well-healed scars.\par  [de-identified] : Gait and Station:  Ambulating with a slightly antalgic to antalgic gait.  Station:  Normal.  [de-identified] : Appearance:  Well-developed, well-nourished male in no acute distress.\par   [de-identified] : Radiographs, one to two views of the right knee taken in the office today, show significant chondrocalcinosis and moderate arthritic changes.  Of note, he did have an MRI back in December, which showed arthritis with meniscal tear (arthroscopic meniscectomy).

## 2022-05-19 NOTE — ADDENDUM
[FreeTextEntry1] : This note was written by Linda Donnelly on 05/19/2022 acting as scribe for Flakito Good III, MD

## 2023-06-28 ENCOUNTER — APPOINTMENT (OUTPATIENT)
Dept: ORTHOPEDIC SURGERY | Facility: CLINIC | Age: 63
End: 2023-06-28
Payer: COMMERCIAL

## 2023-06-28 VITALS
BODY MASS INDEX: 26.52 KG/M2 | HEIGHT: 68 IN | DIASTOLIC BLOOD PRESSURE: 77 MMHG | WEIGHT: 175 LBS | SYSTOLIC BLOOD PRESSURE: 127 MMHG | HEART RATE: 60 BPM

## 2023-06-28 PROCEDURE — 99213 OFFICE O/P EST LOW 20 MIN: CPT | Mod: 25

## 2023-06-28 PROCEDURE — 20551 NJX 1 TENDON ORIGIN/INSJ: CPT | Mod: 59

## 2023-06-28 PROCEDURE — 20605 DRAIN/INJ JOINT/BURSA W/O US: CPT | Mod: LT

## 2023-07-05 DIAGNOSIS — M18.12 UNILATERAL PRIMARY OSTEOARTHRITIS OF FIRST CARPOMETACARPAL JOINT, LEFT HAND: ICD-10-CM

## 2023-07-05 NOTE — ADDENDUM
[FreeTextEntry1] : This note was written by Margarita Weller on 07/05/2023 acting as a scribe for FRANTZ VELASCO III, MD

## 2023-07-05 NOTE — PHYSICAL EXAM
[de-identified] : Right Elbow: \par Range of Motion in Degrees\par 	                                                   Claimant:	Normal:	\par Flexion (Active)	                                      170	                   170	\par Flexion (Passive)	                                      170	                   170	\par Extension(Active)	                                        0	                     0	\par Extension (Passive)	                        0	                     0	\par Pronation/Supination (Active)	                     0-180	                   0-180	\par Pronation/Supination (Passive)                    0-180                     0-180\par \par Point tenderness over the medial epicondyle.  Pain with resisted dorsi and palmar flexion with .  No tenderness to palpation over the lateral epicondyle.  No tenderness over the distal biceps.  No tenderness over the olecranon.  No swelling over the olecranon.  No instability to varus or valgus stress at 0, 30, 60 and 90 degrees.  No instability in the AP plane.  No motor or sensory deficits. 2+ radial and ulnar pulses.  Skin is intact.  No rashes, scars or lesions. \par \par Left Elbow: \par Range of Motion in Degrees\par 	                                          Claimant:	Normal:	\par Flexion (Active)	                          170	170	\par Flexion (Passive)	                          170	170	\par Extension(Active)	                           0	                 0	\par Extension (Passive)	           0	                 0	\par Pronation/Supination (Active)	           0-180	 0-180	\par Pronation/Supination (Passive)          0-180             0-180	\par \par No tenderness to palpation over the medial and lateral epicondyle.  No pain with resisted dorsi or palmar flexion with .  No tenderness over the distal biceps.  No tenderness over the olecranon.  No swelling over the olecranon.  No instability to varus or valgus stress at 0, 30, 60 and 90 degrees.  No instability in the AP plane.  No motor or sensory deficits.  2+ radial and ulnar pulses.  Skin is intact.  No rashes, scars or lesions. \par \par Left Thumb:\par                                                                                                 Claimant:  	   Normal:  \par \par Thumb Interphalangeal Hyperextension/Flexion		15H/80		   15H/80\par \par Thumb Metacarpophalangeal Hyperextension/Flexion	10/55		    10/55\par \par Finger DIP Joints Extension/Flexion			0/80		     0/80\par \par Finger PIP Joints Extension/Flexion 			0/100		     0/100\par \par Finger MCP Joints Hyperextension/Flexion 		(0-45H)/90	     (0-45H)/90\par \par \par Significant tenderness at the basal joint with a positive grind.  FDS, FDP intact.  No triggering.  No tenderness or swelling over the A1 pulley for each finger.  No instability to varus or valgus stress.  No motor or sensory deficits.  Less than two second capillary refill.  Skin is intact.  No rashes, scars or lesions.\par   [de-identified] : Ambulating with a normal gait. [de-identified] : Appearance:  Well-developed, well-nourished male in no acute distress.\par

## 2023-07-05 NOTE — HISTORY OF PRESENT ILLNESS
[de-identified] : The patient comes in today with complaints of pain to his right elbow and left hand/thumb, both have been bothering him for quite sometime and are getting worse recently. He has responded very well in the past with cortisone injections.

## 2023-07-05 NOTE — PROCEDURE
[de-identified] : Indication:  Basal joint arthritis of the left thumb\par \par Consent: \par At this time, I have recommended an injection to the basal joint of the left thumb.  The risks and benefits of the procedure were discussed with the patient in detail.  Upon verbal consent of the patient, we proceeded with the injection as noted below.  \par \par Procedure:  \par After a sterile prep, the patient underwent an injection of 2 cc of 1% Lidocaine (10mg/mL) without epinephrine and 0.5 cc of Kenalog (40mg/mL) into the basal joint of the left thumb.  The patient tolerated the procedure well.  There were no complications.  \par \par : [employer]par Drug name:     Triamcinolone Acetonide Injectable Suspension USP\par ND #:            [id number]\par Lot #:              [id number]\par Expiration:      01/2024 \par \par Procedure #2:\par Indication:  Golfer's elbow of the right elbow\par \par Consent: \par At this time, I have recommended an injection to the right epicondyle.  The risks and benefits of the procedure were discussed with the patient in detail.  Upon verbal consent of the patient, we proceeded with the injection as noted below.  \par \par Procedure:  \par After a sterile prep, the patient underwent an injection of 9 cc of 1% Lidocaine (10mg/mL) without epinephrine and 1 cc of Kenalog (40mg/mL) into the right epicondyle.  The patient tolerated the procedure well.  There were no complications.  \par \par : [employer]par Drug name:     Triamcinolone Acetonide Injectable Suspension USP\par NDC #:            8456-8698-91\par Lot #:              [id number]\par Expiration:      01/2024 \par

## 2023-07-05 NOTE — DISCUSSION/SUMMARY
[de-identified] : At this time, due to golfer's elbow of the right elbow and basal joint arthritis of the left thumb, I recommend cortisone injections for both (as noted above). I recommend ice and elevation. He will be reassessed in four to six weeks.

## 2023-07-25 DIAGNOSIS — M77.01 MEDIAL EPICONDYLITIS, RIGHT ELBOW: ICD-10-CM

## 2023-11-07 NOTE — ASU DISCHARGE PLAN (ADULT/PEDIATRIC) - ACTIVITY LEVEL
Prescription sent  
Refill Request: Warfarin    Last Refilled: 6/27/2023  Sig: current dosage 2 mg Mon, Sat and 1 mg ROW  Last OV: 10/25/2022  Next OV: 11/16/2023    No protocol for medication. Please sign if agreeable.  
No excercise/No heavy lifting/No sports/gym/Weight bearing as tolerated/Elevate extremity

## 2024-04-02 ENCOUNTER — OFFICE (OUTPATIENT)
Dept: URBAN - METROPOLITAN AREA CLINIC 112 | Facility: CLINIC | Age: 64
Setting detail: OPHTHALMOLOGY
End: 2024-04-02
Payer: COMMERCIAL

## 2024-04-02 DIAGNOSIS — H16.223: ICD-10-CM

## 2024-04-02 DIAGNOSIS — H43.811: ICD-10-CM

## 2024-04-02 DIAGNOSIS — H25.13: ICD-10-CM

## 2024-04-02 PROBLEM — H52.213 IRREGULAR ASTIGMATISM; BOTH EYES: Status: ACTIVE | Noted: 2024-04-02

## 2024-04-02 PROCEDURE — 92004 COMPRE OPH EXAM NEW PT 1/>: CPT | Performed by: OPHTHALMOLOGY

## 2024-04-02 PROCEDURE — 92250 FUNDUS PHOTOGRAPHY W/I&R: CPT | Performed by: OPHTHALMOLOGY

## 2024-04-16 ENCOUNTER — RX ONLY (RX ONLY)
Age: 64
End: 2024-04-16

## 2025-05-05 ENCOUNTER — APPOINTMENT (OUTPATIENT)
Dept: ORTHOPEDIC SURGERY | Facility: CLINIC | Age: 65
End: 2025-05-05

## 2025-05-05 DIAGNOSIS — S76.311A STRAIN OF MUSCLE, FASCIA AND TENDON OF THE POSTERIOR MUSCLE GROUP AT THIGH LEVEL, RIGHT THIGH, INITIAL ENCOUNTER: ICD-10-CM

## 2025-05-05 DIAGNOSIS — M19.072 PRIMARY OSTEOARTHRITIS, LEFT ANKLE AND FOOT: ICD-10-CM

## 2025-05-05 DIAGNOSIS — M19.071 PRIMARY OSTEOARTHRITIS, RIGHT ANKLE AND FOOT: ICD-10-CM

## 2025-05-05 PROCEDURE — 73630 X-RAY EXAM OF FOOT: CPT | Mod: RT

## 2025-05-05 PROCEDURE — 99214 OFFICE O/P EST MOD 30 MIN: CPT

## 2025-05-05 PROCEDURE — 72170 X-RAY EXAM OF PELVIS: CPT

## 2025-05-07 VITALS — BODY MASS INDEX: 26.52 KG/M2 | HEIGHT: 68 IN | WEIGHT: 175 LBS

## 2025-05-07 PROBLEM — S76.311A HAMSTRING STRAIN, RIGHT, INITIAL ENCOUNTER: Status: ACTIVE | Noted: 2025-05-05

## 2025-05-07 PROBLEM — M19.071 PRIMARY OSTEOARTHRITIS OF RIGHT FOOT: Status: ACTIVE | Noted: 2025-05-05

## 2025-07-21 NOTE — DISCUSSION/SUMMARY
[de-identified] : At this time, due to basal joint arthritis of the left thumb, I advised ice and elevation.  He will be reassessed in three to four weeks. 
Quality 226: Preventive Care And Screening: Tobacco Use: Screening And Cessation Intervention: Patient screened for tobacco use and is an ex/non-smoker
Detail Level: Detailed
Quality 47: Advance Care Plan: Advance Care Planning discussed and documented in the medical record; patient did not wish or was not able to name a surrogate decision maker or provide an advance care plan.